# Patient Record
Sex: FEMALE | Race: WHITE | Employment: FULL TIME | URBAN - METROPOLITAN AREA
[De-identification: names, ages, dates, MRNs, and addresses within clinical notes are randomized per-mention and may not be internally consistent; named-entity substitution may affect disease eponyms.]

---
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
## 2021-01-28 ENCOUNTER — PATIENT MESSAGE (OUTPATIENT)
Dept: FAMILY MEDICINE CLINIC | Facility: CLINIC | Age: 21
End: 2021-01-28

## 2021-01-28 RX ORDER — AMOXICILLIN AND CLAVULANATE POTASSIUM 875; 125 MG/1; MG/1
1 TABLET, FILM COATED ORAL 2 TIMES DAILY
Qty: 14 TABLET | Refills: 0 | Status: SHIPPED | OUTPATIENT
Start: 2021-01-28 | End: 2021-02-04

## 2021-01-28 NOTE — TELEPHONE ENCOUNTER
Let pt know I erxed augmentin so can get started. Apply heat/warm washcloth and for tomorrow's visit. Is best in person, but if unable to do it, to either take a picture of the area beforehand to show or be willing to discretely show me the area.

## 2021-01-28 NOTE — TELEPHONE ENCOUNTER
From: Bright Martines  To: Roselyn Saucedo MD  Sent: 1/28/2021 12:19 PM CST  Subject: Non-Urgent Medical Question    I have a 3 pm call tomorrow it was wondering if antibiotics could be sent today.  I have a painful pimple on my bottom and I’ve had fo

## 2021-01-29 ENCOUNTER — TELEMEDICINE (OUTPATIENT)
Dept: FAMILY MEDICINE CLINIC | Facility: CLINIC | Age: 21
End: 2021-01-29

## 2021-01-29 ENCOUNTER — PATIENT MESSAGE (OUTPATIENT)
Dept: FAMILY MEDICINE CLINIC | Facility: CLINIC | Age: 21
End: 2021-01-29

## 2021-01-29 DIAGNOSIS — R22.9 PERINEAL LUMP: Primary | ICD-10-CM

## 2021-01-29 PROCEDURE — 99214 OFFICE O/P EST MOD 30 MIN: CPT | Performed by: FAMILY MEDICINE

## 2021-01-29 NOTE — TELEPHONE ENCOUNTER
From: Sasha Núñez  To: Yessy Catherine MD  Sent: 1/29/2021 1:06 PM CST  Subject: Visit Follow-up Question    Can you confirm the following from our call:    800mg ibuprofen /3x a day   in addition to Tylenol/1000 mg /3 x a day.      That would be

## 2021-01-29 NOTE — PROGRESS NOTES
Video Visit- Dinora Mackenzie  This is a telemedicine visit with live, interactive video and audio.      Cecily Siddiqi understands and accepts financial responsibility for any deductible, co-insurance and/or co-pa mood, affect, and hygiene. Assessment/Plan:  1.  Perineal lump   -differential dx: abscess, bartholin gland cyst/abscess, vs other  -continue augmentin and heating pad/warm bath  -if not improving, fever develops, or other concerns develop to go to u

## 2021-02-04 ENCOUNTER — TELEMEDICINE (OUTPATIENT)
Dept: FAMILY MEDICINE CLINIC | Facility: CLINIC | Age: 21
End: 2021-02-04

## 2021-02-04 ENCOUNTER — PATIENT MESSAGE (OUTPATIENT)
Dept: FAMILY MEDICINE CLINIC | Facility: CLINIC | Age: 21
End: 2021-02-04

## 2021-02-04 DIAGNOSIS — Z71.1 PERSON WITH FEARED COMPLAINT, NO DIAGNOSIS MADE: Primary | ICD-10-CM

## 2021-02-04 NOTE — PROGRESS NOTES
Never able to connect with patient. Pt spoke with  and stated she didn't feel a f/u was needed, but then sent me a my chart messaged.  Communicated through my chart platform

## 2021-02-04 NOTE — TELEPHONE ENCOUNTER
From: Erik Schulz  To: Laurel Juarez MD  Sent: 2/4/2021 2:25 PM CST  Subject: Visit Follow-up Question    i'm sorry I could not stay on hold any longer for our video call at 2 PM.   I still feel a little sore in that area.    I have gone back to

## 2021-04-01 DIAGNOSIS — Z23 NEED FOR VACCINATION: ICD-10-CM

## 2021-04-02 ENCOUNTER — PATIENT MESSAGE (OUTPATIENT)
Dept: FAMILY MEDICINE CLINIC | Facility: CLINIC | Age: 21
End: 2021-04-02

## 2021-04-05 NOTE — TELEPHONE ENCOUNTER
From: Delmi Carrasco  To: Danielito Evangelista MD  Sent: 4/2/2021 1:16 PM CDT  Subject: Other    I got Pfizer shot at my work 3/3 and never got the 2nd vaccine.  I’ve asked nati loja many times about my 2nd dose and as of today 4/2 Morena Rolle not heard back

## 2021-04-06 ENCOUNTER — PATIENT MESSAGE (OUTPATIENT)
Dept: FAMILY MEDICINE CLINIC | Facility: CLINIC | Age: 21
End: 2021-04-06

## 2021-04-06 NOTE — TELEPHONE ENCOUNTER
From: Ingrid Blanco  To: Ivor Schilder, MD  Sent: 4/6/2021 12:04 PM CDT  Subject: Non-Urgent Medical Question    I have a form for a new job and they want a work clearance. How to have it filled out by doctor?  I can add image or bring it to office

## 2021-04-08 ENCOUNTER — OFFICE VISIT (OUTPATIENT)
Dept: FAMILY MEDICINE CLINIC | Facility: CLINIC | Age: 21
End: 2021-04-08
Payer: COMMERCIAL

## 2021-04-08 VITALS
OXYGEN SATURATION: 99 % | HEART RATE: 110 BPM | WEIGHT: 202 LBS | HEIGHT: 66 IN | DIASTOLIC BLOOD PRESSURE: 70 MMHG | RESPIRATION RATE: 16 BRPM | TEMPERATURE: 98 F | BODY MASS INDEX: 32.47 KG/M2 | SYSTOLIC BLOOD PRESSURE: 120 MMHG

## 2021-04-08 DIAGNOSIS — R00.0 TACHYCARDIA: ICD-10-CM

## 2021-04-08 DIAGNOSIS — Z11.1 SCREENING-PULMONARY TB: ICD-10-CM

## 2021-04-08 DIAGNOSIS — Z00.00 ROUTINE MEDICAL EXAM: Primary | ICD-10-CM

## 2021-04-08 PROCEDURE — 99395 PREV VISIT EST AGE 18-39: CPT | Performed by: FAMILY MEDICINE

## 2021-04-08 PROCEDURE — 3008F BODY MASS INDEX DOCD: CPT | Performed by: FAMILY MEDICINE

## 2021-04-08 PROCEDURE — 3074F SYST BP LT 130 MM HG: CPT | Performed by: FAMILY MEDICINE

## 2021-04-08 PROCEDURE — 3078F DIAST BP <80 MM HG: CPT | Performed by: FAMILY MEDICINE

## 2021-04-08 RX ORDER — LORATADINE 10 MG/1
10 TABLET ORAL DAILY
COMMUNITY
End: 2022-01-05

## 2021-04-08 NOTE — PROGRESS NOTES
Patient presents with:  Complete Form: Medical Report form prior to starting new job at [employer] in [address] [place] [address]. Brought in copy of her Mantoux TB test & Covid 19 vaccination record card.       HPI:  Kathe Misael is a 24year old female Alcohol use: No      Drug use: No      Sexual activity: Never    Other Topics      Concerns:        Not on file    Social History Narrative      Not on file    Social Determinants of Health  Financial Resource Strain:       Difficulty of Paying Living E Extraocular movements intact, pupils equally round and reactive to light,  conjunctivae normal.    Ears- Tympanic membranes intact bilaterally. Nose- Patent, normal septum and turbinates. Mouth/Throat- Normal oral mucosa, throat non-erythematous.   NE

## 2021-04-09 ENCOUNTER — TELEPHONE (OUTPATIENT)
Dept: FAMILY MEDICINE CLINIC | Facility: CLINIC | Age: 21
End: 2021-04-09

## 2021-04-09 NOTE — TELEPHONE ENCOUNTER
[facility] a  facility form was brought in by patient to her 4/8/21 office visit. Form was completed by Dr. Royer Arambula with a note written on it that the Quantiferon TB lab test is pending.   The original complete

## 2021-04-13 NOTE — TELEPHONE ENCOUNTER
Spoke with pt regarding negative Quantiferon Gold results and notifying her that form needed for her employer is complete. Asked pt if she had fax number for us to send her form, pt states she can  form.  Informd pt it will be at the  for h

## 2021-05-01 ENCOUNTER — PATIENT MESSAGE (OUTPATIENT)
Dept: FAMILY MEDICINE CLINIC | Facility: CLINIC | Age: 21
End: 2021-05-01

## 2021-05-03 NOTE — TELEPHONE ENCOUNTER
From: Nitza Nelson  To: Jael Schultz MD  Sent: 5/1/2021 2:44 PM CDT  Subject: Other    I’d you can update my records to show that I’m fully vaccinated for COVID 19. I’ve had two doses of Pfizer vaccine. Thanks.
no

## 2021-12-10 ENCOUNTER — IMMUNIZATION (OUTPATIENT)
Dept: LAB | Facility: HOSPITAL | Age: 21
End: 2021-12-10
Attending: EMERGENCY MEDICINE
Payer: COMMERCIAL

## 2021-12-10 DIAGNOSIS — Z23 NEED FOR VACCINATION: Primary | ICD-10-CM

## 2021-12-10 PROCEDURE — 0004A SARSCOV2 VAC 30MCG/0.3ML IM: CPT

## 2021-12-21 ENCOUNTER — PATIENT MESSAGE (OUTPATIENT)
Dept: FAMILY MEDICINE CLINIC | Facility: CLINIC | Age: 21
End: 2021-12-21

## 2021-12-21 NOTE — TELEPHONE ENCOUNTER
From: Jane Valle  To: Augustus Wilcox MD  Sent: 12/21/2021 12:50 PM CST  Subject: [facility] vision files    Hel I went to a doctor at [address] [place] [address] and me said both of my optic nerves were swollen.  I cannot get to see a specialist until

## 2021-12-23 NOTE — TELEPHONE ENCOUNTER
Spoke with pt. Mother was doing the [employer]. Pt has headaches every other day. Blurred vision. Says her peripheral vision is fine, no n/v.   -Mother had a craniotomy due to her papilledema so she is worried.  -Pt works and requesting later appt.

## 2021-12-27 ENCOUNTER — TELEPHONE (OUTPATIENT)
Dept: FAMILY MEDICINE CLINIC | Facility: CLINIC | Age: 21
End: 2021-12-27

## 2021-12-27 ENCOUNTER — PATIENT MESSAGE (OUTPATIENT)
Dept: FAMILY MEDICINE CLINIC | Facility: CLINIC | Age: 21
End: 2021-12-27

## 2021-12-27 NOTE — TELEPHONE ENCOUNTER
Pt was originally scheduled for an in person office visit, but was changed to video visit-Pt asked for a COVID test while she was here because she is coughing and sneezing. She has been fully vaccinated and boosted as well.  Pt will need to be seen in perso

## 2021-12-27 NOTE — TELEPHONE ENCOUNTER
From: Lorenzo Portillo  To: Bettina Hdz, DO  Sent: 12/27/2021 11:28 AM CST  Subject: Covid test?    Can i also get Covid test when I come in tomorrow. Félix Perez is sick w cough as well. great timing for this appt I guess.

## 2021-12-29 NOTE — TELEPHONE ENCOUNTER
I see she is scheduled for Sat 1/8. As long as neg for covid, could be seen sooner.  (we ordered a test and it was never done) I see she has rescheduled herself and for ~10 d from our appt, so assuming she had a + covid test, but this is me assuming, please

## 2021-12-30 NOTE — TELEPHONE ENCOUNTER
Pt's mom is calling because daughter is working and cannot answer the call. The pt told her mother to call and see what the call was about. She would like for you to call her instead. Her name is Anastasiia Rm .

## 2021-12-30 NOTE — TELEPHONE ENCOUNTER
Aleksey Yanes requested pt be called to see if pt can come in sooner. Pt has optic nerve swelling, Dr. Marlen Yanes would like pt to be seen sooner.

## 2021-12-30 NOTE — TELEPHONE ENCOUNTER
Spoke with Bonny Dakins about getting pt's appointment sooner for optic nerve swelling. Bonny Dakins states pt cannot be off of work for appointment so it needs to be after 4 pm but she would like pt to be seen sooner as well as pt would like to be seen sooner.  Pt has h

## 2022-01-05 ENCOUNTER — OFFICE VISIT (OUTPATIENT)
Dept: FAMILY MEDICINE CLINIC | Facility: CLINIC | Age: 22
End: 2022-01-05
Payer: COMMERCIAL

## 2022-01-05 VITALS
OXYGEN SATURATION: 99 % | HEART RATE: 65 BPM | DIASTOLIC BLOOD PRESSURE: 70 MMHG | HEIGHT: 66 IN | WEIGHT: 197.5 LBS | SYSTOLIC BLOOD PRESSURE: 120 MMHG | BODY MASS INDEX: 31.74 KG/M2 | TEMPERATURE: 98 F | RESPIRATION RATE: 16 BRPM

## 2022-01-05 DIAGNOSIS — Z00.00 LABORATORY EXAM ORDERED AS PART OF ROUTINE GENERAL MEDICAL EXAMINATION: ICD-10-CM

## 2022-01-05 DIAGNOSIS — H47.10 PAPILLEDEMA: Primary | ICD-10-CM

## 2022-01-05 PROCEDURE — 3008F BODY MASS INDEX DOCD: CPT | Performed by: FAMILY MEDICINE

## 2022-01-05 PROCEDURE — 99214 OFFICE O/P EST MOD 30 MIN: CPT | Performed by: FAMILY MEDICINE

## 2022-01-05 PROCEDURE — 3074F SYST BP LT 130 MM HG: CPT | Performed by: FAMILY MEDICINE

## 2022-01-05 PROCEDURE — 3078F DIAST BP <80 MM HG: CPT | Performed by: FAMILY MEDICINE

## 2022-01-05 NOTE — PROGRESS NOTES
HPI:   Samantha Hence is a 22year old female. Patient presents with:  Eye Problem: per patient she states she has swollen optic nerve per her eye doctor and needs an MRI    Had recent visit with optomery which showed papilledema.   Has appointment wi cervical LAD, no thyromegaly. SKIN: No rashes, no skin lesion, no bruising, good turgor. HEART:  Regular rate and rhythm, no murmurs, rubs or gallops. LUNGS: Clear to auscultation bilterally, no rales/rhonchi/wheezing.   ABDOMEN:  Soft, nondistended, non

## 2022-01-29 ENCOUNTER — HOSPITAL ENCOUNTER (OUTPATIENT)
Dept: MRI IMAGING | Age: 22
Discharge: HOME | End: 2022-01-29
Attending: FAMILY MEDICINE
Payer: COMMERCIAL

## 2022-01-29 DIAGNOSIS — H47.10 PAPILLEDEMA: ICD-10-CM

## 2022-01-29 PROCEDURE — A9575 INJ GADOTERATE MEGLUMI 0.1ML: HCPCS | Performed by: FAMILY MEDICINE

## 2022-01-29 PROCEDURE — 70553 MRI BRAIN STEM W/O & W/DYE: CPT | Performed by: FAMILY MEDICINE

## 2022-01-29 PROCEDURE — 70543 MRI ORBT/FAC/NCK W/O &W/DYE: CPT | Performed by: FAMILY MEDICINE

## 2022-04-15 ENCOUNTER — PATIENT MESSAGE (OUTPATIENT)
Dept: FAMILY MEDICINE CLINIC | Facility: CLINIC | Age: 22
End: 2022-04-15

## 2022-04-18 NOTE — TELEPHONE ENCOUNTER
MRI result faxed to Dr. Francisco Negrete at [facility], [phone], confirmation received.

## 2022-04-18 NOTE — TELEPHONE ENCOUNTER
From: Brigette Shine  To: Sharmin Baird DO  Sent: 4/15/2022 12:36 PM CDT  Subject: MRI test results    Hello can you send the MRI test results to Dr. Luis Olmos at [facility] eye [facility]? Please let me know if you can do this thank you.

## 2022-08-15 ENCOUNTER — TELEMEDICINE (OUTPATIENT)
Dept: FAMILY MEDICINE CLINIC | Facility: CLINIC | Age: 22
End: 2022-08-15

## 2022-08-15 DIAGNOSIS — R05.1 ACUTE COUGH: Primary | ICD-10-CM

## 2022-08-15 DIAGNOSIS — R07.9 CHEST PAIN, UNSPECIFIED TYPE: ICD-10-CM

## 2022-08-15 RX ORDER — AZITHROMYCIN 250 MG/1
TABLET, FILM COATED ORAL
Qty: 6 TABLET | Refills: 0 | Status: SHIPPED | OUTPATIENT
Start: 2022-08-15 | End: 2022-08-20

## 2022-08-15 RX ORDER — ALBUTEROL SULFATE 90 UG/1
2 AEROSOL, METERED RESPIRATORY (INHALATION) EVERY 4 HOURS PRN
Qty: 1 EACH | Refills: 0 | Status: SHIPPED | OUTPATIENT
Start: 2022-08-15

## 2022-09-11 DIAGNOSIS — R05.1 ACUTE COUGH: ICD-10-CM

## 2022-09-11 DIAGNOSIS — R07.9 CHEST PAIN, UNSPECIFIED TYPE: ICD-10-CM

## 2022-09-14 RX ORDER — ALBUTEROL SULFATE 90 UG/1
2 AEROSOL, METERED RESPIRATORY (INHALATION) EVERY 4 HOURS PRN
Qty: 6.7 EACH | Refills: 0 | Status: SHIPPED | OUTPATIENT
Start: 2022-09-14

## 2022-09-20 ENCOUNTER — PATIENT MESSAGE (OUTPATIENT)
Dept: FAMILY MEDICINE CLINIC | Facility: CLINIC | Age: 22
End: 2022-09-20

## 2022-09-21 ENCOUNTER — PATIENT MESSAGE (OUTPATIENT)
Dept: FAMILY MEDICINE CLINIC | Facility: CLINIC | Age: 22
End: 2022-09-21

## 2022-09-22 ENCOUNTER — OFFICE VISIT (OUTPATIENT)
Dept: FAMILY MEDICINE CLINIC | Facility: CLINIC | Age: 22
End: 2022-09-22

## 2022-09-22 VITALS
HEIGHT: 66 IN | DIASTOLIC BLOOD PRESSURE: 60 MMHG | BODY MASS INDEX: 30.56 KG/M2 | SYSTOLIC BLOOD PRESSURE: 110 MMHG | RESPIRATION RATE: 16 BRPM | WEIGHT: 190.13 LBS | TEMPERATURE: 98 F

## 2022-09-22 DIAGNOSIS — Z13.31 NEGATIVE DEPRESSION SCREENING: ICD-10-CM

## 2022-09-22 DIAGNOSIS — Z01.84 IMMUNITY STATUS TESTING: ICD-10-CM

## 2022-09-22 DIAGNOSIS — Z23 NEED FOR VACCINATION: ICD-10-CM

## 2022-09-22 DIAGNOSIS — Z00.00 ANNUAL PHYSICAL EXAM: Primary | ICD-10-CM

## 2022-09-22 DIAGNOSIS — Z28.21 INFLUENZA VACCINATION DECLINED BY PATIENT: ICD-10-CM

## 2022-09-22 PROCEDURE — 3074F SYST BP LT 130 MM HG: CPT | Performed by: FAMILY MEDICINE

## 2022-09-22 PROCEDURE — 3008F BODY MASS INDEX DOCD: CPT | Performed by: FAMILY MEDICINE

## 2022-09-22 PROCEDURE — 90471 IMMUNIZATION ADMIN: CPT | Performed by: FAMILY MEDICINE

## 2022-09-22 PROCEDURE — 90715 TDAP VACCINE 7 YRS/> IM: CPT | Performed by: FAMILY MEDICINE

## 2022-09-22 PROCEDURE — 3078F DIAST BP <80 MM HG: CPT | Performed by: FAMILY MEDICINE

## 2022-09-22 PROCEDURE — 99395 PREV VISIT EST AGE 18-39: CPT | Performed by: FAMILY MEDICINE

## 2022-09-22 PROCEDURE — 90472 IMMUNIZATION ADMIN EACH ADD: CPT | Performed by: FAMILY MEDICINE

## 2022-09-29 ENCOUNTER — PATIENT MESSAGE (OUTPATIENT)
Dept: FAMILY MEDICINE CLINIC | Facility: CLINIC | Age: 22
End: 2022-09-29

## 2022-09-29 NOTE — TELEPHONE ENCOUNTER
From: Fabiola Peters  To: Donita Pathak MD  Sent: 9/29/2022 9:20 AM CDT  Subject: I think have sinus infection     Hello on 8/15 I had sinus issues w drip and cough. I have it again w low grade temp for 2 days 99. Negative Covid test Taking dayquil and Allegra d. I have albuterol still but chest is not tight      Can I get the zpak again?  These kids at my  getting me sick

## 2022-10-04 RX ORDER — AZITHROMYCIN 250 MG/1
TABLET, FILM COATED ORAL
Qty: 6 TABLET | Refills: 0 | Status: SHIPPED | OUTPATIENT
Start: 2022-10-04 | End: 2022-10-09

## 2022-12-12 ENCOUNTER — PATIENT MESSAGE (OUTPATIENT)
Dept: FAMILY MEDICINE CLINIC | Facility: CLINIC | Age: 22
End: 2022-12-12

## 2022-12-15 NOTE — TELEPHONE ENCOUNTER
I signed what I could. I see one TB test was done, but the 2nd of the 2-step process is not on file. I'm assuming as this is there form a 2-step is required. If she has done this then would need to provide them with documentation of the 2nd or if not needs to schedule. Form placed in basket.

## 2023-01-14 ENCOUNTER — HOSPITAL ENCOUNTER (EMERGENCY)
Age: 23
Discharge: HOME | End: 2023-01-14
Attending: EMERGENCY MEDICINE
Payer: COMMERCIAL

## 2023-01-14 VITALS
RESPIRATION RATE: 18 BRPM | BODY MASS INDEX: 28.25 KG/M2 | DIASTOLIC BLOOD PRESSURE: 91 MMHG | TEMPERATURE: 99 F | HEART RATE: 110 BPM | WEIGHT: 180 LBS | OXYGEN SATURATION: 100 % | HEIGHT: 67 IN | SYSTOLIC BLOOD PRESSURE: 157 MMHG

## 2023-01-14 DIAGNOSIS — H66.002 NON-RECURRENT ACUTE SUPPURATIVE OTITIS MEDIA OF LEFT EAR WITHOUT SPONTANEOUS RUPTURE OF TYMPANIC MEMBRANE: Primary | ICD-10-CM

## 2023-01-14 PROCEDURE — 99283 EMERGENCY DEPT VISIT LOW MDM: CPT

## 2023-01-14 RX ORDER — AMOXICILLIN AND CLAVULANATE POTASSIUM 875; 125 MG/1; MG/1
1 TABLET, FILM COATED ORAL 2 TIMES DAILY
Qty: 20 TABLET | Refills: 0 | Status: SHIPPED | OUTPATIENT
Start: 2023-01-14 | End: 2023-01-24

## 2023-01-14 RX ORDER — AMOXICILLIN AND CLAVULANATE POTASSIUM 875; 125 MG/1; MG/1
1 TABLET, FILM COATED ORAL 2 TIMES DAILY
Qty: 20 TABLET | Refills: 0 | Status: SHIPPED | OUTPATIENT
Start: 2023-01-14 | End: 2023-01-14 | Stop reason: CLARIF

## 2023-01-15 NOTE — ED INITIAL ASSESSMENT (HPI)
Pt to ed with c/o left ear pain and bleeding since last night, pt also reports she has been having intermittent nose bleeds

## 2023-01-15 NOTE — DISCHARGE INSTRUCTIONS
Take Tylenol Advil for pain. Take antibiotics as prescribed. Do not put anything in your ear. Follow-up with ENT this week.

## 2023-01-16 ENCOUNTER — TELEPHONE (OUTPATIENT)
Dept: FAMILY MEDICINE CLINIC | Facility: CLINIC | Age: 23
End: 2023-01-16

## 2023-01-16 NOTE — TELEPHONE ENCOUNTER
Pt's mother called and said Wiley Raymundo was in the [facility] ER on Saturday and was diagnosed with an earache. Mom said it's still bothering her. I offered a video visit but she thought it would be better in person. Does she need to be seen in person?

## 2023-01-16 NOTE — TELEPHONE ENCOUNTER
Pt would like to come into office.   Future Appointments   Date Time Provider Ang Bailey   1/17/2023  4:00 PM AMAURI Cheney [facility] [address]

## 2023-02-03 ENCOUNTER — NURSE ONLY (OUTPATIENT)
Dept: INTERNAL MEDICINE CLINIC | Facility: HOSPITAL | Age: 23
End: 2023-02-03
Attending: EMERGENCY MEDICINE

## 2023-02-03 DIAGNOSIS — Z00.00 WELLNESS EXAMINATION: Primary | ICD-10-CM

## 2023-02-03 PROCEDURE — 86480 TB TEST CELL IMMUN MEASURE: CPT

## 2023-02-06 LAB
M TB IFN-G CD4+ T-CELLS BLD-ACNC: 0.01 IU/ML
M TB TUBERC IFN-G BLD QL: NEGATIVE
M TB TUBERC IGNF/MITOGEN IGNF CONTROL: >10 IU/ML
QFT TB1 AG MINUS NIL: 0 IU/ML
QFT TB2 AG MINUS NIL: 0.01 IU/ML

## (undated) NOTE — LETTER
18    To Whom It May Concern:    Mandy Gant ( 6/10/2000) is under our medical care. Please excuse her from school on 18 as she was undergoing diagnostic testing.   Please do not hesitate to contact our office if you have any further que

## (undated) NOTE — ED AVS SNAPSHOT
Silvia Whitehead   MRN: [id number]    Department:  [address] [facility] in [place]   Date of Visit:  4/30/2018           Disclosure     Insurance plans vary and the physician(s) referred by the ER may not be covered by your plan.  Please cont tell this physician (or your personal doctor if your instructions are to return to your personal doctor) about any new or lasting problems. The primary care or specialist physician will see patients referred from the [facility] Emergency Department.  Marifer Peralta

## (undated) NOTE — LETTER
Date: 1/29/2021    Patient Name: Kade Shaw          To Whom it may concern: This letter has been written at the patient's request. The above patient was seen at the [facility] for treatment of a medical condition.     The patient is